# Patient Record
Sex: FEMALE | Race: OTHER | NOT HISPANIC OR LATINO | URBAN - METROPOLITAN AREA
[De-identification: names, ages, dates, MRNs, and addresses within clinical notes are randomized per-mention and may not be internally consistent; named-entity substitution may affect disease eponyms.]

---

## 2018-04-28 ENCOUNTER — EMERGENCY (EMERGENCY)
Facility: HOSPITAL | Age: 63
LOS: 1 days | Discharge: HOME | End: 2018-04-28
Attending: EMERGENCY MEDICINE
Payer: COMMERCIAL

## 2018-04-28 VITALS
SYSTOLIC BLOOD PRESSURE: 131 MMHG | DIASTOLIC BLOOD PRESSURE: 72 MMHG | RESPIRATION RATE: 16 BRPM | HEART RATE: 86 BPM | TEMPERATURE: 98 F | WEIGHT: 149.91 LBS | OXYGEN SATURATION: 97 %

## 2018-04-28 VITALS
HEART RATE: 82 BPM | OXYGEN SATURATION: 99 % | TEMPERATURE: 99 F | SYSTOLIC BLOOD PRESSURE: 127 MMHG | DIASTOLIC BLOOD PRESSURE: 68 MMHG | RESPIRATION RATE: 18 BRPM

## 2018-04-28 PROCEDURE — 73630 X-RAY EXAM OF FOOT: CPT

## 2018-04-28 PROCEDURE — 99284 EMERGENCY DEPT VISIT MOD MDM: CPT | Mod: 25

## 2018-04-28 PROCEDURE — 99283 EMERGENCY DEPT VISIT LOW MDM: CPT

## 2018-04-28 PROCEDURE — 73610 X-RAY EXAM OF ANKLE: CPT | Mod: 26,LT

## 2018-04-28 PROCEDURE — 73610 X-RAY EXAM OF ANKLE: CPT

## 2018-04-28 PROCEDURE — 73562 X-RAY EXAM OF KNEE 3: CPT | Mod: 26,LT

## 2018-04-28 PROCEDURE — 73630 X-RAY EXAM OF FOOT: CPT | Mod: 26,LT

## 2018-04-28 PROCEDURE — 73562 X-RAY EXAM OF KNEE 3: CPT

## 2018-04-28 RX ORDER — ALBUTEROL 90 UG/1
0 AEROSOL, METERED ORAL
Qty: 0 | Refills: 0 | COMMUNITY

## 2018-04-28 RX ORDER — IBUPROFEN 200 MG
600 TABLET ORAL ONCE
Qty: 0 | Refills: 0 | Status: COMPLETED | OUTPATIENT
Start: 2018-04-28 | End: 2018-04-28

## 2018-04-28 RX ADMIN — Medication 600 MILLIGRAM(S): at 19:39

## 2018-04-28 NOTE — ED PROVIDER NOTE - OBJECTIVE STATEMENT
this is a 63 yo fem, hx of asthma, presenting to the ED via ambulance s/p trip and fall down several steps with left ankle/foot pain. pt states she is unable to bare weight and is having difficulty walking, some relief with ice pack and splint applied by EMS. pt denies hitting her head, LOC, nausea, vomiting, neck pain, or any other injury. denies numbness/tingling to the foot. this is a 63 yo fem, hx of asthma, presenting to the ED via ambulance s/p trip and fall down several steps with left ankle/foot pain. pt states she is unable to bear weight and is having difficulty walking, some relief with ice pack and splint applied by EMS. pt denies hitting her head, LOC, nausea, vomiting, neck pain, or any other injury. denies numbness/tingling to the foot.

## 2018-04-28 NOTE — ED PROVIDER NOTE - NS ED ROS FT
Constitutional: - Fever, - Chills, - Anorexia, - Fatigue  GI: - Nausea, - Vomting  MSK: - Myalgias, + Arthralgias, - Weakness, - Deformities, + Injuries, - Neck pain  SKIN: - Color change, - Rash, - Swelling, - Bruises, - Wounds  NEURO: - Change in behavior, - Dec. Alertness, - Headache, - Dizziness

## 2018-04-28 NOTE — ED PROVIDER NOTE - ATTENDING CONTRIBUTION TO CARE
Patient with mechanical trip a fall with ankle and knee pain. on exam tenderness to palpation. no head injury. xray normal home with primary care physician followup. ambulatory

## 2018-04-28 NOTE — ED PROVIDER NOTE - PHYSICAL EXAMINATION
PE: GEN: Awake, alert, interactive, NAD, non-toxic appearing.   HEAD: Atraumatic  EYES: PERRLA  NEURO: AOx3, CN II-XII grossly intact without focal deficit. motor/sensation intact to LLE.  MSK: Moving all extremities with no apparent deformities. Lateral malleolar tenderness, FROM.  SKIN: Warm, dry, normal color, without apparent rashes.

## 2018-04-28 NOTE — ED PROVIDER NOTE - MEDICAL DECISION MAKING DETAILS
63 yo fem s/p fall down several steps, unable to bare weight or take several steps yielding a + ottowa ankle rule. will image ankle, foot, and knee to rule out fx/ana involvement, splint appropriately, discharge with fu care.

## 2018-04-28 NOTE — ED ADULT NURSE NOTE - OBJECTIVE STATEMENT
Pt states that she tripped and fell at home today and twisted her left foot. co pain. Denies head injury and loc

## 2018-04-28 NOTE — ED PROVIDER NOTE - PROGRESS NOTE DETAILS
Reevaluated patient at bedside.  Patient feeling much improved.  Discussed the results of all diagnostic testing in ED and copies of all reports given.   An opportunity to ask questions was given.  Discussed the importance of prompt, close medical follow-up.  Patient will return with any changes, concerns or persistent / worsening symptoms.  Understanding of all instructions verbalized. will apply air cast and hard sole shoe with cane, podiatry fu.